# Patient Record
Sex: FEMALE | Race: WHITE | Employment: FULL TIME | URBAN - METROPOLITAN AREA
[De-identification: names, ages, dates, MRNs, and addresses within clinical notes are randomized per-mention and may not be internally consistent; named-entity substitution may affect disease eponyms.]

---

## 2017-03-15 ENCOUNTER — OFFICE VISIT (OUTPATIENT)
Dept: URGENT CARE | Facility: RETAIL CLINIC | Age: 22
End: 2017-03-15
Payer: COMMERCIAL

## 2017-03-15 VITALS
SYSTOLIC BLOOD PRESSURE: 124 MMHG | TEMPERATURE: 98.3 F | DIASTOLIC BLOOD PRESSURE: 68 MMHG | HEART RATE: 87 BPM | OXYGEN SATURATION: 100 %

## 2017-03-15 DIAGNOSIS — J02.9 ACUTE PHARYNGITIS, UNSPECIFIED ETIOLOGY: Primary | ICD-10-CM

## 2017-03-15 DIAGNOSIS — J06.9 UPPER RESPIRATORY TRACT INFECTION, UNSPECIFIED TYPE: ICD-10-CM

## 2017-03-15 LAB — S PYO AG THROAT QL IA.RAPID: NORMAL

## 2017-03-15 PROCEDURE — 87880 STREP A ASSAY W/OPTIC: CPT | Mod: QW | Performed by: PHYSICIAN ASSISTANT

## 2017-03-15 PROCEDURE — 87081 CULTURE SCREEN ONLY: CPT | Performed by: PHYSICIAN ASSISTANT

## 2017-03-15 PROCEDURE — 99202 OFFICE O/P NEW SF 15 MIN: CPT | Performed by: PHYSICIAN ASSISTANT

## 2017-03-15 ASSESSMENT — PAIN SCALES - GENERAL: PAINLEVEL: SEVERE PAIN (6)

## 2017-03-15 NOTE — PROGRESS NOTES
"  Chief Complaint   Patient presents with     Pharyngitis     x2d     Fatigue     Headache     x2d     URI     shortness of breath, chest hurts when coughing         SUBJECTIVE:   Pt. presenting to [facility] -  with a chief complaint of URI symptoms and ST.   Here with spouse.  Onset of symptoms gradual  Course of illness is same.    Severity mild  Current and Associated symptoms: \"cold symptoms\", cough  and sore throat  Treatment measures tried include Fluids, OTC meds and Rest.  Predisposing factors include None (works as a )  Last antibiotic > year    Pregnancy no  Smoker no  No past medical history on file.  No past surgical history on file.  There is no problem list on file for this patient.    Current Outpatient Prescriptions   Medication     Pseudoephedrine-APAP-DM (DAYQUIL OR)     No current facility-administered medications for this visit.        OBJECTIVE:  /68 (BP Location: Right arm, Patient Position: Chair, Cuff Size: Adult Regular)  Pulse 87  Temp 98.3  F (36.8  C) (Oral)  SpO2 100%    GENERAL APPEARANCE: cooperative, alert and no distress. Appears well hydrated.  EYES: conjunctiva clear  HENT: Rt ear canal  clear and TM normal   Lt ear canal clear and TM normal   Nose some congestion. clear discharge  Mouth without ulcers or lesions. mild erythema. no exudate.   NECK: supple, few small shoddy NT ant nodes. No  posterior nodes.  RESP: lungs clear to auscultation - no rales, rhonchi or wheezes. Breathing easily.  CV: regular rates and rhythm  ABDOMEN:  soft, nontender, no HSM or masses and bowel sounds normal   SKIN: no suspicious lesions or rashes  no tenderness to palpate over  sinus areas.    Rapid strep neg    ASSESSMENT:  Acute pharyngitis, unspecified etiology  URI    PLAN:  Symptomatic measures   Throat culture pending - will be notified of positive results only.  OTC cough suppressant/expectorant discussed  Salt water gargles  - throat lozenges or honey/lemon tea if " soothing   saline nasal spray for  nasal congestion   Cool mist vaporizer   Stay in clean air environment.  > rest.  > fluids.  Contagiousness and hygiene discussed.  Fever and pain  control measures discussed.   If unable to swallow or any breathing difficulty to go to ED   AVS given and discussed:  Patient Instructions      * PHARYNGITIS (Sore Throat),REPORT PENDING    Pharyngitis (sore throat) is often due to a virus, but can also be caused by the  strep  bacteria. This is called  strep throat . Both viral and strep infection can cause throat pain that is worse when swallowing, aching all over with headache and fever. Both types of infections are contagious. They may be spread by coughing, kissing or touching others after touching your mouth or nose, so wash your hands often.  A test has been done to determine whether or not you have strep throat. If it is positive for strep infection you will usually need to take antibiotics. If the test is negative, you probably have a viral pharyngitis, and antibiotic treatment will not help you recover.  HOME CARE:    If your symptoms are severe, rest at home for the first 2-3 days. If you are told that your test is positive for strep, you should be off work and school for the first two days of antibiotic treatment. After that, you will no longer be as contagious.    Children: Use acetaminophen (Tylenol) for fever, fussiness or discomfort. In infants over six months of age, you may use ibuprofen (Children's Motrin) instead of Tylenol. [NOTE: If your child has chronic liver or kidney disease or ever had a stomach ulcer or GI bleeding, talk with your doctor before using these medicines.]   (Aspirin should never be used in anyone under 18 years of age who is ill with a fever. It may cause severe liver damage.)  Adults: You may use acetaminophen (Tylenol) 650-1000 mg every 6 hours or ibuprofen (Motrin, Advil) 600 mg every 6-8 hours with food to control pain, if you are able to  take these medicines. [NOTE: If you have chronic liver or kidney disease or ever had a stomach ulcer or GI bleeding, talk with your doctor before using these medicines.]    Throat lozenges or sprays (Chloraseptic and others), or gargling with warm salt water will reduce throat pain. Dissolve 1/2 teaspoon of salt in 1 glass of warm water. This is especially useful just before meals.     FOLLOW UP with your doctor as advised by our staff if you are not improving over the next week.  GET PROMPT MEDICAL ATTENTION  if any of the following occur:    Fever over 101 F (38.3 C) for more than three days    New or worsening ear pain, sinus pain or headache    Unable to swallow liquids or open your mouth wide due to throat pain    Trouble breathing    Muffled voice    New rash       [id number] [facility], [address] [place] [address], [place] [address]. All rights reserved. This information is not intended as a substitute for professional medical care. Always follow your healthcare professional's instructions.      ..............................................      Please FOLLOW UP at primary care clinic if not improving, new symptoms, worse or this does not resolve.  [facility]  [phone]    FOLLOW UP with PCP if not improving,  anytime if worse and if symptoms do not resolve.    See letter for work  PT is comfortable with this plan.  Electronically signed,  BRETT Ta, PAC

## 2017-03-15 NOTE — LETTER
[address] [address]        3/15/2017    Jocelyn Banks was seen 3/15/2017 at the [facility] in [place], [place]. Please excuse Jocelyn from work this week as needed due to illness. Jocelyn may return to work when afebrile x 1 day and feeling better.      Cordially,        Tatyana Ta, PAC

## 2017-03-15 NOTE — MR AVS SNAPSHOT
After Visit Summary   3/15/2017    Jocelyn Ryan    MRN: [id number]           Patient Information     Date Of Birth          1995        Visit Information        Provider Department      3/15/2017 6:00 PM Tatyana Ta PA-C [facility]        Today's Diagnoses     Acute pharyngitis, unspecified etiology    -  1      Care Instructions       * PHARYNGITIS (Sore Throat),REPORT PENDING    Pharyngitis (sore throat) is often due to a virus, but can also be caused by the  strep  bacteria. This is called  strep throat . Both viral and strep infection can cause throat pain that is worse when swallowing, aching all over with headache and fever. Both types of infections are contagious. They may be spread by coughing, kissing or touching others after touching your mouth or nose, so wash your hands often.  A test has been done to determine whether or not you have strep throat. If it is positive for strep infection you will usually need to take antibiotics. If the test is negative, you probably have a viral pharyngitis, and antibiotic treatment will not help you recover.  HOME CARE:    If your symptoms are severe, rest at home for the first 2-3 days. If you are told that your test is positive for strep, you should be off work and school for the first two days of antibiotic treatment. After that, you will no longer be as contagious.    Children: Use acetaminophen (Tylenol) for fever, fussiness or discomfort. In infants over six months of age, you may use ibuprofen (Children's Motrin) instead of Tylenol. [NOTE: If your child has chronic liver or kidney disease or ever had a stomach ulcer or GI bleeding, talk with your doctor before using these medicines.]   (Aspirin should never be used in anyone under 18 years of age who is ill with a fever. It may cause severe liver damage.)  Adults: You may use acetaminophen (Tylenol) 650-1000 mg every 6 hours or ibuprofen (Motrin, Advil) 600 mg every  "6-8 hours with food to control pain, if you are able to take these medicines. [NOTE: If you have chronic liver or kidney disease or ever had a stomach ulcer or GI bleeding, talk with your doctor before using these medicines.]    Throat lozenges or sprays (Chloraseptic and others), or gargling with warm salt water will reduce throat pain. Dissolve 1/2 teaspoon of salt in 1 glass of warm water. This is especially useful just before meals.     FOLLOW UP with your doctor as advised by our staff if you are not improving over the next week.  GET PROMPT MEDICAL ATTENTION  if any of the following occur:    Fever over 101 F (38.3 C) for more than three days    New or worsening ear pain, sinus pain or headache    Unable to swallow liquids or open your mouth wide due to throat pain    Trouble breathing    Muffled voice    New rash       [id number] [facility], [address] [place] [address]. All rights reserved. This information is not intended as a substitute for professional medical care. Always follow your healthcare professional's instructions.      ..............................................      Please FOLLOW UP at primary care clinic if not improving, new symptoms, worse or this does not resolve.  [facility]  [phone]          Follow-ups after your visit        Who to contact     You can reach your care team any time of the day by calling [phone].  Notification of test results:  If you have an abnormal lab result, we will notify you by phone as soon as possible.         Additional Information About Your Visit        [employer] Information     [employer] lets you send messages to your doctor, view your test results, renew your prescriptions, schedule appointments and more. To sign up, go to [email or web address] . Click on \"Log in\" on the left side of the screen, which will take you to the Welcome page. Then click on \"Sign up Now\" on the right side of the page.     You will be asked " to enter the access code listed below, as well as some personal information. Please follow the directions to create your username and password.     Your access code is: [email or web address]  Expires: 2017  6:31 PM     Your access code will  in 90 days. If you need help or a new code, please call your [facility] or [phone].        Care EveryWhere ID     This is your Care EveryWhere ID. This could be used by other organizations to access your [place] medical records  [id number]        Your Vitals Were     Pulse Temperature Pulse Oximetry             87 98.3  F (36.8  C) (Oral) 100%          Blood Pressure from Last 3 Encounters:   03/15/17 124/68   08/30/10 92/58   10/03/07 116/46    Weight from Last 3 Encounters:   08/30/10 141 lb (64 kg) (87 %)*   10/03/07 126 lb (57.2 kg) (94 %)*   10/02/07 126 lb (57.2 kg) (94 %)*     * Growth percentiles are based on [facility] 2-20 Years data.              We Performed the Following     BETA STREP GROUP A R/O CULTURE     RAPID STREP SCREEN        Primary Care Provider Office Phone # Fax #    Melody Dumont PA-C [phone]       [address] [place] [address]        Thank you!     Thank you for choosing [facility]  for your care. Our goal is always to provide you with excellent care. Hearing back from our patients is one way we can continue to improve our services. Please take a few minutes to complete the written survey that you may receive in the mail after your visit with us. Thank you!             Your Updated Medication List - Protect others around you: Learn how to safely use, store and throw away your medicines at www.disposemymeds.org.          This list is accurate as of: 3/15/17  6:31 PM.  Always use your most recent med list.                   Brand Name Dispense Instructions for use    DAYQUIL OR

## 2017-03-15 NOTE — PATIENT INSTRUCTIONS

## 2017-03-18 LAB — BETA STREP CONFIRM: NORMAL

## 2017-05-26 ENCOUNTER — HEALTH MAINTENANCE LETTER (OUTPATIENT)
Age: 22
End: 2017-05-26

## 2017-06-02 ENCOUNTER — HEALTH MAINTENANCE LETTER (OUTPATIENT)
Age: 22
End: 2017-06-02

## 2020-01-09 ENCOUNTER — OFFICE VISIT (OUTPATIENT)
Dept: OBGYN | Facility: OTHER | Age: 25
End: 2020-01-09
Payer: COMMERCIAL

## 2020-01-09 VITALS
BODY MASS INDEX: 26.13 KG/M2 | HEART RATE: 70 BPM | WEIGHT: 166.5 LBS | DIASTOLIC BLOOD PRESSURE: 60 MMHG | SYSTOLIC BLOOD PRESSURE: 110 MMHG | HEIGHT: 67 IN

## 2020-01-09 DIAGNOSIS — Q51.3 BICORNUATE UTERUS: Primary | ICD-10-CM

## 2020-01-09 DIAGNOSIS — Z12.4 SCREENING FOR MALIGNANT NEOPLASM OF CERVIX: ICD-10-CM

## 2020-01-09 DIAGNOSIS — Z11.3 SCREEN FOR STD (SEXUALLY TRANSMITTED DISEASE): ICD-10-CM

## 2020-01-09 PROCEDURE — G0145 SCR C/V CYTO,THINLAYER,RESCR: HCPCS | Performed by: OBSTETRICS & GYNECOLOGY

## 2020-01-09 PROCEDURE — 87591 N.GONORRHOEAE DNA AMP PROB: CPT | Performed by: OBSTETRICS & GYNECOLOGY

## 2020-01-09 PROCEDURE — 99203 OFFICE O/P NEW LOW 30 MIN: CPT | Performed by: OBSTETRICS & GYNECOLOGY

## 2020-01-09 PROCEDURE — 87491 CHLMYD TRACH DNA AMP PROBE: CPT | Performed by: OBSTETRICS & GYNECOLOGY

## 2020-01-09 RX ORDER — NORETHINDRONE ACETATE AND ETHINYL ESTRADIOL 1MG-20(21)
1 KIT ORAL DAILY
Qty: 28 TABLET | Refills: 1 | Status: SHIPPED | OUTPATIENT
Start: 2020-01-09 | End: 2020-03-10

## 2020-01-09 ASSESSMENT — MIFFLIN-ST. JEOR: SCORE: 1546.83

## 2020-01-09 NOTE — PROGRESS NOTES
"  SUBJECTIVE:       HPI: Jocelyn Ryan is a 24 year old  who presents today for history of ovarian cysts and possible history bicornuate uterine found on ultrasound in 2018. Prior ultrasounds have mixed findings. She is concerned about increased pelvic pain and heavy bleeding associated with menses in the last 1-2 months. She is recently  in October and they are talking about trying to get pregnant in the next year. She has never been on any form of hormonal medication for birth control, and currently only uses condoms. She has no other complaints today.    She denies vaginal discharge, dyspareunia. She denies fevers/chills, nausea/vomiting, abdominal pain or bloating. Denies dysuria, hematuria, constipation or diarrhea.      Ob Hx: G0      Gyn Hx: Patient's last menstrual period was 2020.    Patient is sexually active. One male partner   Last pap was unknown, not sure if she has ever had one   STI history denies  Using condoms for contraception.   STD testing offered?  Declined  Menarche 13 years old. Menses every 28 days. Regular-heavy flow, last ing 4-7 days.  Family history of gyn-related malignancies: denies        Problem list and histories reviewed & adjusted, as indicated.  Additional history: as documented.    There is no problem list on file for this patient.    History reviewed. No pertinent surgical history.   Social History     Tobacco Use     Smoking status: Never Smoker     Smokeless tobacco: Never Used   Substance Use Topics     Alcohol use: Yes     Comment: on weekends            Pseudoephedrine-APAP-DM (DAYQUIL OR),     No current facility-administered medications on file prior to visit.     Allergies   Allergen Reactions     No Known Drug Allergies        ROS:  10 Point review of systems negative other noted above in HPI    OBJECTIVE:     /60   Pulse 70   Ht 1.708 m (5' 7.25\")   Wt 75.5 kg (166 lb 8 oz)   LMP 2020   BMI 25.88 kg/m    Body mass index is 25.88 " kg/m .      Gen: Alert, oriented, appropriately interactive, NAD  Chest: Symmetrical, unlabored breathing  Abdomen: soft, non tender, non distended, no masses, no hernias. No inguinal lymphadenopathy.   External genitalia: no lesions; normal appearing external genitalia, bartholins glands, urethra, skenes glands  Vagina: no masses or lesions or discharge, normally rugated.  Cervix: no masses or lesions or discharge   Bimanual exam:   Nontender pelvic floor muscles  Urethra: nontender   Bladder: nontender and without massess, well supported   Uterus: midline, retroverted, small, mobile  no masses, non-tender  Adnexa: no masses or tenderness appreciated   No cervical motion tenderness  MSK: normal gait, symmetric movements UE & LE  Lower extremities: non-tender, no edema      In-Clinic Test Results:  No results found for this or any previous visit (from the past 24 hour(s)).    ASSESSMENT/PLAN:                                                      Jocelyn Ryan is a 24 year old G0 who presents today for pelvic pain      ICD-10-CM    1. Bicornuate uterus Q51.3 norethindrone-ethinyl estradiol (JUNEL FE 1/20) 1-20 MG-MCG tablet     US Pelvic Complete with Transvaginal   2. Screening for malignant neoplasm of cervix Z12.4 Pap imaged thin layer screen only - recommended age 21 - 24 years   3. Screen for STD (sexually transmitted disease) Z11.3 Neisseria gonorrhoeae PCR     Chlamydia trachomatis PCR       Suspected bicornuate uterus on ultrasound in 2018 and history of ovarian cysts, with worsening pelvic pain and heavy menstrual bleeding for 1-2 months. No prior history hormonal medication, and is interested in future pregnancy within 1 year. Given inconsistent ultrasound in past and new symptoms, will order ultrasound at this time for further evaluation, rule out ovarian cysts. Discussed at length the diagnosis of bicornuate uterus and future impact on pregnancy.     We discussed all forms of birth control available,  including LARC therapy (IUD, Nexplanon), Depo Provera, OCPs, condoms and permanent sterilization in form of tubal ligation or vasectomy. Given future desire for pregnancy, LARC and surgical options not desired. After discussing risks and benefits of options, she decided that she would like to try COCs at this time. She has no medical contraindications to this method.    Reviewed potential side effects of OCP's including but not limited to thromboembolic events, hypertension, breakthrough bleeding, GI upset, and headaches.  Reviewed proper usage.  Reviewed use of back up contraception and proper use. Rx sent to pharmacy.       RTO 3 months to review symptoms on method and plan for future. Will notify of ultrasound results after completed.      Danielle Morrell,   [facility]

## 2020-01-09 NOTE — LETTER
January 15, 2020      Jocelyn MORALES Connor  [address] [address]    Dear ,      I am happy to inform you that your recent cervical cancer screening test (PAP smear) was normal.      Preventative screenings such as this help to ensure your health for years to come. You should repeat a pap smear in 3 years, unless otherwise directed.      You will still need to return to the clinic every year for your annual exam and other preventive tests.     If you have additional questions regarding this result, please call our registered nurse, Sienna at [phone].      Sincerely,      Danielle Morrell DO/ronal

## 2020-01-10 LAB
C TRACH DNA SPEC QL NAA+PROBE: NEGATIVE
N GONORRHOEA DNA SPEC QL NAA+PROBE: NEGATIVE
SPECIMEN SOURCE: NORMAL
SPECIMEN SOURCE: NORMAL

## 2020-01-14 LAB
COPATH REPORT: NORMAL
PAP: NORMAL

## 2020-02-13 ENCOUNTER — OFFICE VISIT (OUTPATIENT)
Dept: OBGYN | Facility: OTHER | Age: 25
End: 2020-02-13
Payer: COMMERCIAL

## 2020-02-13 ENCOUNTER — ANCILLARY PROCEDURE (OUTPATIENT)
Dept: ULTRASOUND IMAGING | Facility: OTHER | Age: 25
End: 2020-02-13
Attending: OBSTETRICS & GYNECOLOGY
Payer: COMMERCIAL

## 2020-02-13 VITALS
BODY MASS INDEX: 25.34 KG/M2 | WEIGHT: 163 LBS | HEART RATE: 59 BPM | SYSTOLIC BLOOD PRESSURE: 110 MMHG | DIASTOLIC BLOOD PRESSURE: 70 MMHG

## 2020-02-13 DIAGNOSIS — N83.201 CYST OF RIGHT OVARY: ICD-10-CM

## 2020-02-13 DIAGNOSIS — Q51.3 BICORNUATE UTERUS: ICD-10-CM

## 2020-02-13 DIAGNOSIS — Q51.3 BICORNUATE UTERUS: Primary | ICD-10-CM

## 2020-02-13 PROCEDURE — 76856 US EXAM PELVIC COMPLETE: CPT

## 2020-02-13 PROCEDURE — 99213 OFFICE O/P EST LOW 20 MIN: CPT | Performed by: OBSTETRICS & GYNECOLOGY

## 2020-02-13 PROCEDURE — 76830 TRANSVAGINAL US NON-OB: CPT

## 2020-02-13 NOTE — NURSING NOTE
"Chief Complaint   Patient presents with     Follow Up     ovarian cyst       Initial /70 (BP Location: Right arm, Patient Position: Chair, Cuff Size: Adult Regular)   Pulse 59   Wt 73.9 kg (163 lb)   LMP 2020 (Approximate)   BMI 25.34 kg/m   Estimated body mass index is 25.34 kg/m  as calculated from the following:    Height as of 20: 1.708 m (5' 7.25\").    Weight as of this encounter: 73.9 kg (163 lb).  BP completed using cuff size: regular        The following HM Due: NONE      The following patient reported/Care Every where data was sent to:  P ABSTRACT QUALITY INITIATIVES [45075]       Jenny Phillip, JESSICA  2020           "

## 2020-02-13 NOTE — PROGRESS NOTES
SUBJECTIVE:       HPI: Jocelyn Ryan is a 24 year old  who presents today for f/u right ovarian cyst. At her last visit, she was is concerned about increased pelvic pain and heavy bleeding associated with menses in the last 1-2 months. She is recently  in October and they are talking about trying to get pregnant in the next year.  She was started on OCPs in January.      Since that time, she has been doing well. Her menses are regular and lighter in flow. She still notes intermittent pelvic pain and dysmenorrhea. Happy with method and would like to continue.     She denies vaginal discharge, irregular bleeding, dyspareunia. She denies fevers/chills, nausea/vomiting, abdominal pain or bloating. Denies dysuria, hematuria, constipation or diarrhea.      Ob Hx:      Gyn Hx: Patient's last menstrual period was 2020 (approximate).    Patient is sexually active. One male partner              Last pap was 20 NIL              STI history denies  Using condoms for contraception.   STD testing offered?  Declined  Menarche 13 years old. Menses every 28 days. Regular-heavy flow, lasting 4-7 days.  Family history of gyn-related malignancies: denies       reports that she has never smoked. She has never used smokeless tobacco.      Today's PHQ-2 Score: No flowsheet data found.  Today's PHQ-9 Score: No flowsheet data found.  Today's DENZEL-7 Score: No flowsheet data found.    Problem list and histories reviewed & adjusted, as indicated.  Additional history: as documented.    There is no problem list on file for this patient.    History reviewed. No pertinent surgical history.   Social History     Tobacco Use     Smoking status: Never Smoker     Smokeless tobacco: Never Used   Substance Use Topics     Alcohol use: Yes     Comment: on weekends            norethindrone-ethinyl estradiol (JUNEL FE 1/20) 1-20 MG-MCG tablet, Take 1 tablet by mouth daily  Pseudoephedrine-APAP-DM (DAYQUIL OR),     No current  facility-administered medications on file prior to visit.     Allergies   Allergen Reactions     No Known Drug Allergies        ROS:  10 Point review of systems negative other noted above in HPI    OBJECTIVE:     /70 (BP Location: Right arm, Patient Position: Chair, Cuff Size: Adult Regular)   Pulse 59   Wt 73.9 kg (163 lb)   LMP 01/31/2020 (Approximate)   BMI 25.34 kg/m    Body mass index is 25.34 kg/m .      Gen: Alert, oriented, appropriately interactive, NAD  Chest: Symmetrical, unlabored breathing  Abdomen: soft, non tender, non distended  Pelvic: Deferred  MSK: normal gait, symmetric movements UE & LE  Lower extremities: non-tender, no edema      In-Clinic Test Results:  Results for orders placed or performed in visit on 02/13/20 (from the past 24 hour(s))   US Pelvic Complete with Transvaginal    Narrative    PELVIC ULTRASOUND WITH ENDOVAGINAL TRANSDUCER  IMAGING  2/13/2020 9:00  AM     HISTORY: Bicornuate uterus.    TECHNIQUE:  Endovaginal sonography was added to the transabdominal  exam.    COMPARISON: None available    FINDINGS:   Endometrium: Endometrium is 9 mm thick.    Uterus: Measures 6.5 x 6.0 x 4.3 cm. No uterine masses. Divergent  appearance of the endometrial cavity, could represent bicornuate  versus septate uterus.    Right ovary: Measures 3.3 x 1.6 x 1.8 cm. It demonstrates normal  follicular structure and flow.    Left ovary: Measures 3.7 x 4.0 x 2.9 cm. There is 3.7 x 2.8 x 1.5 cm  hypoechoic cyst in the left ovary, indeterminate, could represent  dominant follicle versus hemorrhagic cyst.    Additional findings: No significant free fluid in the pelvis.      Impression    IMPRESSION:    1. Divergent appearance of the cranial aspect of the endometrial  cavity, could represent bicornuate versus septate uterus, this can be  further evaluated with pelvic MRI for definitive characterization if  clinically warranted.  2. 3.7 cm left ovarian hypoechoic cyst, indeterminate, could  represent  dominant follicle versus hemorrhagic cyst, recommend 6-week follow-up  exam to ensure resolution.       ASSESSMENT/PLAN:                                                      Jocelyn Ryan is a 24 year old  who presents today for f/u ovarian cysts, bicornuate uterus      ICD-10-CM    1. Bicornuate uterus Q51.3 MR Pelvis (GYN) wo & w Contrast   2. Cyst of right ovary N83.201 MR Pelvis (GYN) wo & w Contrast     US Pelvic Complete with Transvaginal       Pelvic ultrasound completed today, formal results pending. Uterus appears to by bicornuate versus septate. Given multiple different reports on this, recommending pelvic MRI at this time. 3.7cm left ovarian cyst, dominant follicle verus hemorrhagic cyst. F/u ultrasound 6 weeks. No SE on OCPs, BP normotensive. Would like to continue method until ready for pregnancy, likely within next year. Advised to start PNV.      Danielle Morrell, DO  [facility]

## 2020-03-02 ENCOUNTER — HOSPITAL ENCOUNTER (OUTPATIENT)
Dept: MRI IMAGING | Facility: CLINIC | Age: 25
Discharge: HOME | End: 2020-03-02
Attending: OBSTETRICS & GYNECOLOGY | Admitting: OBSTETRICS & GYNECOLOGY
Payer: COMMERCIAL

## 2020-03-02 DIAGNOSIS — Q51.3 BICORNUATE UTERUS: ICD-10-CM

## 2020-03-02 DIAGNOSIS — N83.201 CYST OF RIGHT OVARY: ICD-10-CM

## 2020-03-02 PROCEDURE — 25500064 ZZH RX 255 OP 636: Performed by: OBSTETRICS & GYNECOLOGY

## 2020-03-02 PROCEDURE — A9585 GADOBUTROL INJECTION: HCPCS | Performed by: OBSTETRICS & GYNECOLOGY

## 2020-03-02 PROCEDURE — 72197 MRI PELVIS W/O & W/DYE: CPT

## 2020-03-02 RX ORDER — GADOBUTROL 604.72 MG/ML
7.5 INJECTION INTRAVENOUS ONCE
Status: COMPLETED | OUTPATIENT
Start: 2020-03-02 | End: 2020-03-02

## 2020-03-02 RX ADMIN — GADOBUTROL 7.5 ML: 604.72 INJECTION INTRAVENOUS at 10:19

## 2020-03-09 ENCOUNTER — TELEPHONE (OUTPATIENT)
Dept: OBGYN | Facility: OTHER | Age: 25
End: 2020-03-09

## 2020-03-09 DIAGNOSIS — Q51.3 BICORNUATE UTERUS: ICD-10-CM

## 2020-03-09 NOTE — TELEPHONE ENCOUNTER
Reason for Call:  Medication or medication refill:    Do you use a [facility]?  Name of the pharmacy and phone number for the current request:  [facility] - [phone]    Name of the medication requested: birth control    Other request: Patient was unsure of the name of the medication    Can we leave a detailed message on this number? NO    Phone number patient can be reached at: Home number on file [phone] (home)    Best Time:     Call taken on 3/9/2020 at 5:14 PM by Porsha Arreaga

## 2020-03-10 RX ORDER — NORETHINDRONE ACETATE AND ETHINYL ESTRADIOL 1MG-20(21)
1 KIT ORAL DAILY
Qty: 90 TABLET | Refills: 3 | Status: SHIPPED | OUTPATIENT
Start: 2020-03-10 | End: 2021-03-01

## 2020-03-10 NOTE — TELEPHONE ENCOUNTER
Pt was last seen in clinic on 2/13/2020.  Per OV plan:  F/u ultrasound 6 weeks. No SE on OCPs, BP normotensive. Would like to continue method until ready for pregnancy, likely within next year.   OCP last prescribed on 1/9/2020 for a one month supply with one additional refill.  Will route to Dr. Morrell to see if she is willing to write rx for a 3 month supply.    Porsha Flores RN

## 2020-03-10 NOTE — TELEPHONE ENCOUNTER
Danielle Morrell, DO  You 15 minutes ago (7:46 AM)       Done, thanks!     Danielle Morrell, DO    Routing comment      Notified pt that OCP's have been sent to her pharmacy.    Porsha Flores RN

## 2021-02-25 DIAGNOSIS — Q51.3 BICORNUATE UTERUS: ICD-10-CM

## 2021-02-25 NOTE — TELEPHONE ENCOUNTER
Received fax refill request for Destinee Fe 1/20 form [facility] in [place].    Jenny Phillip, CMA  February 25, 2021

## 2021-02-25 NOTE — TELEPHONE ENCOUNTER
"Contraceptives Protocol Failed    Rerun Protocol (2/25/2021 7:16 AM)     Recent (12 mo) or future (30 days) visit within the authorizing provider's specialty    Patient has had an office visit with the authorizing provider or a provider within the authorizing providers department within the previous 12 mos or has a future within next 30 days. See \"Patient Info\" tab in inbasket, or \"Choose Columns\" in Meds & Orders section of the refill encounter.             Patient is not a current smoker if age is 35 or older       Medication is active on med list       No active pregnancy on record       No positive pregnancy test in past 12 months       Medication: norethindrone-ethinyl estradiol (JUNEL FE 1/20) 1-20 MG-MCG tablet   Last Written Prescription Date:  3/10/2020  Last Fill Quantity: 90   # refills: 3  Last Office Visit: 2/13/2020    Future Office visit:  Nothing scheduled. Patient is overdue for her annual exam. RN called to help assist her with scheduling.   Unable to reach patient via phone. Left message to call clinic back at [phone].    Hellen Ahn RN on 2/25/2021 at 8:59 AM        "

## 2021-02-26 NOTE — TELEPHONE ENCOUNTER
Unable to reach patient via phone. RN left a message and instructed patient to call the clinic at [phone] to schedule annual exam.    Opal Rosales RN on 2/26/2021 at 10:12 AM

## 2021-03-01 RX ORDER — NORETHINDRONE ACETATE AND ETHINYL ESTRADIOL 1MG-20(21)
1 KIT ORAL DAILY
Qty: 90 TABLET | Refills: 0 | Status: SHIPPED | OUTPATIENT
Start: 2021-03-01

## 2021-03-09 ENCOUNTER — OFFICE VISIT (OUTPATIENT)
Dept: OBGYN | Facility: CLINIC | Age: 26
End: 2021-03-09
Payer: COMMERCIAL

## 2021-03-09 VITALS
DIASTOLIC BLOOD PRESSURE: 82 MMHG | WEIGHT: 183 LBS | TEMPERATURE: 98.5 F | BODY MASS INDEX: 28.45 KG/M2 | SYSTOLIC BLOOD PRESSURE: 114 MMHG | HEART RATE: 102 BPM

## 2021-03-09 DIAGNOSIS — Z30.41 ENCOUNTER FOR SURVEILLANCE OF CONTRACEPTIVE PILLS: ICD-10-CM

## 2021-03-09 DIAGNOSIS — Z01.419 WELL WOMAN EXAM WITH ROUTINE GYNECOLOGICAL EXAM: Primary | ICD-10-CM

## 2021-03-09 PROCEDURE — 99395 PREV VISIT EST AGE 18-39: CPT | Performed by: OBSTETRICS & GYNECOLOGY

## 2021-03-09 RX ORDER — ESCITALOPRAM OXALATE 10 MG/1
10 TABLET ORAL
COMMUNITY
Start: 2020-09-22 | End: 2021-09-22

## 2021-03-09 RX ORDER — NORGESTIMATE AND ETHINYL ESTRADIOL 0.25-0.035
1 KIT ORAL DAILY
Qty: 90 TABLET | Refills: 4 | Status: SHIPPED | OUTPATIENT
Start: 2021-03-09

## 2021-06-29 NOTE — PROGRESS NOTES
SUBJECTIVE:       HPI: Jocelyn Larson is a 25 year old  who presents today for well woman exam.     Needing birth control refill. Last used OCP was 2 weeks ago (ran out of supply). About 4 months after starting OCPs, started bleeding every 2 weeks. Bleeding lasting 2-3 days, light flow. Usually pills on time, but has missed dosing/been late with dosing.     Experienced weight gain and anxiety after starting OCP last year.   Saw a provider for anxiety, started on Lexapro and doing well. Does not want to stop OCPs.   Is not interested in other forms of birth control, prefers pill option.     She denies vaginal discharge, dysmenorrhea, dyspareunia. She denies fevers/chills, nausea/vomiting, abdominal pain or bloating. Denies dysuria, hematuria, constipation or diarrhea.      Ob Hx:     Gyn Hx: No LMP recorded (lmp unknown).    Patient is sexually active. 1 partner male partner   Last pap was 20 NIL, No history of abnormal paps. Next pap due 2023   STI history No  Using oral contraceptives for contraception.   STD testing offered?  Declined  Menarche 13 years old.         reports that she has never smoked. She has never used smokeless tobacco.      Today's PHQ-2 Score: No flowsheet data found.  Today's PHQ-9 Score: No flowsheet data found.  Today's DENZEL-7 Score: No flowsheet data found.  Problem list and histories reviewed & adjusted, as indicated.  Additional history: as documented.    There is no problem list on file for this patient.    History reviewed. No pertinent surgical history.   Social History     Tobacco Use     Smoking status: Never Smoker     Smokeless tobacco: Never Used   Substance Use Topics     Alcohol use: Yes     Comment: on weekends            escitalopram (LEXAPRO) 10 MG tablet, Take 10 mg by mouth  norethindrone-ethinyl estradiol (JUNEL FE 1/20) 1-20 MG-MCG tablet, Take 1 tablet by mouth daily  Pseudoephedrine-APAP-DM (DAYQUIL OR),     No current facility-administered  Pt transferred to ICU per bed into 2011; pt is A&O but very weak;report given to [employer] medications on file prior to visit.     Allergies   Allergen Reactions     No Known Drug Allergies        ROS:  10 Point review of systems negative other noted above in HPI    OBJECTIVE:     /82   Pulse 102   Temp 98.5  F (36.9  C) (Temporal)   Wt 83 kg (183 lb)   LMP  (LMP Unknown)   BMI 28.45 kg/m    Body mass index is 28.45 kg/m .    Gen: Alert, oriented, appropriately interactive, NAD  Neck: soft, no cervical adenopathy, no masses  CV: RRR, no murmurs, no extra heart sounds, 2+ peripheral pulses  Resp: CTAB, good effort without distress   Breasts: no axillary adenopathy, no dominant masses, no skin changes, no nipple discharge, nontender  Abdomen: soft, non tender, non distended, no masses, no hernias. No inguinal lymphadenopathy.   Pelvic: Deferred through mutual decision making  MSK: normal gait, symmetric movements UE & LE  Lower extremities: non-tender, no edema    In-Clinic Test Results:  No results found for this or any previous visit (from the past 24 hour(s)).    ASSESSMENT/PLAN:                                                      Jocelyn Larson is a 25 year old  who presents today for well woman exam      ICD-10-CM    1. Well woman exam with routine gynecological exam  Z01.419    2. Encounter for surveillance of contraceptive pills  Z30.41 norgestimate-ethinyl estradiol (ORTHO-CYCLEN) 0.25-35 MG-MCG tablet     WWE. Pap not due today, exam deferred after discussing with patient.  Declined STI declined.    Contraception with breakthrough bleeding, possible underlying compliance concern. Discussed alternative options, patient would like to continue OCPs. Reviewed proper use. Also discussed changing dosing, new Rx sent. Trial new OCP for 3-6 months; return if no improvement for exam, possible US and further evaluation.    Marcos Coy, [facility]    Physician Attestation:  I was present with the student who participated in the service and in the documentation of the note. I have verified the  history and personally performed the physical exam and medical decision making. I agree with the assessment and plan of care as documented in the note, and have edited to reflect my findings.    Danielle Morrell DO  [facility]

## 2021-07-29 ENCOUNTER — NURSE TRIAGE (OUTPATIENT)
Dept: FAMILY MEDICINE | Facility: CLINIC | Age: 26
End: 2021-07-29

## 2021-07-29 NOTE — TELEPHONE ENCOUNTER
Woke up this am with weird hive like on neck and face mainly neck and eyes, eyes are dry and under eyes a little swollen, no new soaps or creams. Whole face is super dry and itchy. No new OTC was taken a green and red super powder has taken in the past has been taking this between 4 and 6 weeks. Was on Lexapro and was off and tried something else, went back on Lexapro about 2 to 2.5 weeks ago.   Lips are very dry  Patient drinks between half gallon and a gallon of water everyday.     Skin has been having  really dry skin for a few weeks including lips.   Has had hives in the past, this rash is red and bumpy a little raised and itches. Went for a run about a week ago, works out a lot but is inside. No wood ticks found this year.   Works for [employer] and the NP Tete at work ordered the Lexapro     Admits a little constipation    Did use a new sun screen SPF 30 a few weeks ago for a couple days but not used since because was causing a skin break out.       DENIES: fever, diarrhea, nausea, urinary issues, headaches, vision or hearing changes, wheezing, breathing issues. Swallowing issues.       Has not taken anything for the rash/itch.  Has tried lotion nothing has helped, has tried 3 different chap sticks.     patient number [phone]    Nurse advised patient to call clinic if new symptoms and see NP at work    Also advised patient if any of these occur go to ED: dyspnea/SOB, swallowing difficulty, feels like lump in throat that does not pass, swelling of face/lips.     Patient verbalized understanding and agreement with plan and had no questions.     Patient verbalized understanding and agreement with plan and had no questions.          Reason for Disposition    Mild localized rash    Additional Information    Negative: Sounds like a life-threatening emergency to the triager    Negative: Possible contact with poison ivy or oak    Negative: Insect bite(s) suspected    Negative: Athlete's Foot suspected (i.e.,  "itchy rash between the toes)    Negative: Jock Itch suspected (i.e., itchy rash on inner thighs near genital area)    Negative: Wound infection suspected (i.e., pain, spreading redness, or pus; in a cut, puncture, scrape or sutured wound)    Negative: Rash of external female genital area (vulva)    Negative: Rash of penis or scrotum    Negative: Small spot, skin growth, or mole    Negative: Fever and localized purple or blood-colored spots or dots that are not from injury or friction    Negative: Fever and localized rash is very painful    Negative: Patient sounds very sick or weak to the triager    Negative: Looks like a boil, infected sore, deep ulcer, or other infected rash (spreading redness, pus)    Negative: Painful rash with multiple small blisters grouped together (i.e., dermatomal distribution or 'band' or 'stripe')    Negative: Localized rash is very painful (no fever)    Negative: Localized purple or blood-colored spots or dots that are not from injury or friction (no fever)    Negative: Lyme disease suspected (e.g., bull's-eye rash or tick bite / exposure)    Negative: Patient wants to be seen    Negative: Tender bumps in armpits    Negative: Pimples (localized) and no improvement after using CARE ADVICE    Negative: SEVERE local itching persists after 2 days of steroid cream    Negative: Applying cream or ointment and it causes severe itch, burning, or pain    Negative: Localized rash present > 7 days    Negative: Red, moist, irritated area between skin folds (or under larger breasts)    Negative: Localized area of skin darkening or thickening on lower legs or ankles and has NOT been evaluated by a doctor (or NP/PA)    Answer Assessment - Initial Assessment Questions  1. APPEARANCE of RASH: \"Describe the rash.\"       Looks like hives  2. LOCATION: \"Where is the rash located?\"       Neck and face  3. NUMBER: \"How many spots are there?\"       Cluster right under cheek on the neck   4. SIZE: \"How big are " "the spots?\" (Inches, centimeters or compare to size of a coin)       Bigger then a 50 cent piece  5. ONSET: \"When did the rash start?\"       sometime during the night   6. ITCHING: \"Does the rash itch?\" If so, ask: \"How bad is the itch?\"  (Scale 1-10; or mild, moderate, severe)      Itches like a dry itch, like after a bad sunburn when healing   7. PAIN: \"Does the rash hurt?\" If so, ask: \"How bad is the pain?\"  (Scale 1-10; or mild, moderate, severe)      No pain   8. OTHER SYMPTOMS: \"Do you have any other symptoms?\" (e.g., fever)      No other symptoms  9. PREGNANCY: \"Is there any chance you are pregnant?\" \"When was your last menstrual period?\"      No, LMP 7/16/2021    Protocols used: RASH OR REDNESS - [id number]      Ni Tijerina RN  [facility]                   "